# Patient Record
Sex: MALE | Race: WHITE | NOT HISPANIC OR LATINO | Employment: OTHER | URBAN - METROPOLITAN AREA
[De-identification: names, ages, dates, MRNs, and addresses within clinical notes are randomized per-mention and may not be internally consistent; named-entity substitution may affect disease eponyms.]

---

## 2018-02-12 ENCOUNTER — TRANSFERRED RECORDS (OUTPATIENT)
Dept: HEALTH INFORMATION MANAGEMENT | Facility: CLINIC | Age: 63
End: 2018-02-12

## 2018-04-23 ENCOUNTER — TRANSFERRED RECORDS (OUTPATIENT)
Dept: HEALTH INFORMATION MANAGEMENT | Facility: CLINIC | Age: 63
End: 2018-04-23

## 2018-06-04 PROCEDURE — 00000346 ZZHCL STATISTIC REVIEW OUTSIDE SLIDES TC 88321: Performed by: UROLOGY

## 2018-06-13 LAB — COPATH REPORT: NORMAL

## 2018-06-14 ENCOUNTER — PRE VISIT (OUTPATIENT)
Dept: UROLOGY | Facility: CLINIC | Age: 63
End: 2018-06-14

## 2018-06-14 NOTE — TELEPHONE ENCOUNTER
New patient consult for prostate cancer.  Records/imaging/path available in EPIC/Care Everywhere/PACS.

## 2018-06-18 ENCOUNTER — OFFICE VISIT (OUTPATIENT)
Dept: UROLOGY | Facility: CLINIC | Age: 63
End: 2018-06-18
Payer: COMMERCIAL

## 2018-06-18 VITALS
DIASTOLIC BLOOD PRESSURE: 81 MMHG | WEIGHT: 192.2 LBS | HEART RATE: 56 BPM | SYSTOLIC BLOOD PRESSURE: 136 MMHG | HEIGHT: 72 IN | BODY MASS INDEX: 26.03 KG/M2 | OXYGEN SATURATION: 98 %

## 2018-06-18 DIAGNOSIS — C61 MALIGNANT NEOPLASM OF PROSTATE (H): Primary | ICD-10-CM

## 2018-06-18 RX ORDER — ASPIRIN 325 MG
162.5 TABLET ORAL
COMMUNITY

## 2018-06-18 ASSESSMENT — PAIN SCALES - GENERAL: PAINLEVEL: NO PAIN (0)

## 2018-06-18 NOTE — LETTER
6/18/2018       RE: Fili Padilla  [address]  [place] [address]     Dear Colleague,    Thank you for referring your patient, Fili Padilla, to the [facility] UROLOGY AND INST FOR PROSTATE AND UROLOGIC CANCERS at [facility]. Please see a copy of my visit note below.    Service Date: 06/18/2018      REASON FOR VISIT TODAY:  Prostate cancer.      HISTORY OF PRESENT ILLNESS:  Mr. Padilla is a 62-year-old gentleman who comes to see us for recent diagnosis of prostate cancer.  The patient was noted to have a PSA elevations to 5.3 on 09/25/2017 as well as values of 5.01 on 01/19/2018.        The patient underwent a prostate biopsy on 02/14/2018 that showed a [place] 3 + 4 = 7 and small volume of cancer on the right side of the prostate.  The patient apparently had an MRI performed prior to the biopsy that did not show any highly suspicious lesions.  The patient subsequently had a followup PSA on 06/14/2018 that is down to 4.8.  The patient was counseled on various treatment options and comes in today to discuss these further.  The patient notes minimal in the way of urinary symptoms outside of some mild incomplete emptying and some occasional urgency.      PAST MEDICAL HISTORY:  Significant for good health.      PAST SURGICAL HISTORY:  Includes removal of a fatty tumor from the skin.      MEDICATIONS:     1.  Aspirin.      ALLERGIES:  Codeine.      FAMILY HISTORY:  Significant for mother with bladder cancer.      SOCIAL HISTORY:  Negative for tobacco and alcohol.      REVIEW OF SYSTEMS:  Negative for fevers, chills, sweats, nausea, vomiting, unexplained weight changes.      PHYSICAL EXAMINATION:   VITAL SIGNS:  The patient's blood pressure is 136/81, pulse is 56.   GENERAL:  He is in no acute distress.      The patient's pathology slides were reread by our pathologist and our reread includes Carina 3 + 3 = 6 in less than 5% of a core from the right base,  Carina 3 + 3 = 6 in 10% of 1 out of 3 cores in the right mid, Carina 3 + 3 = 6 involving 1 core less than 5% of tissue from the right apex and possibly minute amount of cancer that is likely [place] pattern 3 from the left apex.  No Carina pattern 4 was noted in the specimen.      ASSESSMENT AND PLAN:  Over half of today's for a 50-minute visit was spent counseling the patient regarding his prostate cancer.  I suggested to Mr. Padilla that we are pleased to see that our pathologists have downgraded to his [place] score from 3 + 4 to a 3 + 3.  This now placed the patient in the low risk category as opposed to intermediate risk.        Further, the total volume of cancer is fairly small, even where the cancer was seen.  We discussed with the patient, therefore, he has options including observation in the form of active surveillance as well as surgery, both open and robotically assisted laparoscopic approaches as well as radiation therapy in the form of external beam or proton beam therapy.  We also briefly touched on cryotherapy and high intensity focused ultrasound.  We discussed risks of surgery including incontinence and erectile dysfunction and risks of radiation therapy including development of irritative urinary symptoms, blood in the urine, urgency, frequency of stooling, chronically loose stools or blood in stool.        We then spent a lot of time discussing observation and that active surveillance would entail PSAs and digital rectal exams every 6 months with periodic MRIs of the prostate and periodic repeat biopsies.  I counseled the patient that not only is he ultimately, at this point considered to have low-risk disease, but very close to having very low risk disease.  He falls short of this criteria since he has 3 cores positive instead of 2 cores positive for cancer.        The patient and his wife asked many good questions today and these were answered to their satisfaction.  At this point in  time, the patient wishes to follow an observation approach given the low risk disease characteristics seen on his repeat biopsy.  We will plan on obtaining the patient's MRI that he had done at the outside and have it reread by our radiologist to make sure that there are no highly suspicious lesions that need to be followed up on with a repeat biopsy.  We will then have a phone appointment to go over those results.  Assuming that there are no suspicious lesions, then we will continue on with an observation approach for the time being.        We did discuss that the disease could progress to an incurable state during observation and the patient understands this, but wishes to move forward.  We look forward to talking to Mr. Pace after his MRI has been reread.      D: 2018   T: 2018   MT: DOLORES      Name:     MARCI PACE   MRN:      [id number]        Account:      [id number]   :      1955           Service Date: 2018      Document: [id number]       Again, thank you for allowing me to participate in the care of your patient.      Sincerely,    Mehrdad Jeffers MD

## 2018-06-18 NOTE — MR AVS SNAPSHOT
After Visit Summary   6/18/2018    Fili Padilla    MRN: [id number]           Patient Information     Date Of Birth          1955        Visit Information        Provider Department      6/18/2018 2:30 PM Mehrdad Jeffers MD [facility] Urology and [facility] for Prostate and Urologic Cancers        Today's Diagnoses     Malignant neoplasm of prostate (H)    -  1      Care Instructions    Schedule a telephone appointment with Dr. Jeffers.    It was a pleasure meeting with you today.  Thank you for allowing me and my team the privilege of caring for you today.  YOU are the reason we are here, and I truly hope we provided you with the excellent service you deserve.  Please let us know if there is anything else we can do for you so that we can be sure you are leaving completely satisfied with your care experience.                  Follow-ups after your visit        Your next 10 appointments already scheduled     Sep 24, 2018  3:30 PM CDT   Telephone Call with Mehrdad Jeffers MD   [facility] Urology and [facility] for Prostate and Urologic Cancers ([facility])    [address] [place] [address] [phone]           Note: this is not an onsite visit; there is no need to come to the facility.  Thank you for choosing [facility] for your health care needs. Below is some information for patients who are interested in having their follow-up visit with a physician by telephone. In some cases, a telephone visit can be an effective and convenient way to manage your follow-up care. Choosing a telephone visit rather than a face to face visit for your follow-up care is a decision that you and your physician can make together to ensure it meets all of your needs.  A face to face visit is always an available option, if you choose to do so.  We want to make sure you have all of the information you need about the telephone  visit option and answer all of your questions before you decide to schedule a telephone follow-up visit. If you have any questions, you may talk to a staff member or our financial counselor at [phone].  1. General overview Our clinic sees patients for a variety of conditions and concerns. A face to face visit with your doctor is required for any new concerns or for your initial visit. If you and your doctor decide that a follow up visit by telephone is appropriate, you may decide to opt for a telephone visit.   2. Billing and insurance coverage There is a charge for telephone visits, similar to the charge for an in-person visit. Your bill is based on the amount of time you and your physician are on the phone. We will bill each visit to your insurance company (just like your other medical visits), and you will be responsible for any costs not paid by your insurance company. The charge may be denied by your insurance company, in which case you will be responsible for the entire amount billed. The decision to cover the cost is determined by your insurance company. If you want to know what your insurance company will cover, we encourage you to contact them to determine your coverage. The codes below are the codes we use when billing for telephone visits and the associated charges. This may help you work with your insurance company to determine your benefits.   Billing CPT codes for Telephone visits  20739 ($30), 87828 ($35), 84648 ($40)              Who to contact     Please call your clinic at [phone] to:    Ask questions about your health    Make or cancel appointments    Discuss your medicines    Learn about your test results    Speak to your doctor            Additional Information About Your Visit        PharmaINhart Information     [email or web address] gives you secure access to your electronic health record. If you see a primary care provider, you can also send messages to your care team and make appointments. If you  have questions, please call your primary care clinic.  If you do not have a primary care provider, please call [phone] and they will assist you.      Superbly is an electronic gateway that provides easy, online access to your medical records. With Superbly, you can request a clinic appointment, read your test results, renew a prescription or communicate with your care team.     To access your existing account, please contact your [facility] or call [phone] for assistance.        Care EveryWhere ID     This is your Care EveryWhere ID. This could be used by other organizations to access your [place] medical records  [id number]        Your Vitals Were     Pulse Height Pulse Oximetry BMI (Body Mass Index)          56 1.829 m (6') 98% 26.07 kg/m2         Blood Pressure from Last 3 Encounters:   06/18/18 136/81    Weight from Last 3 Encounters:   06/18/18 87.2 kg (192 lb 3.2 oz)              Today, you had the following     No orders found for display       Primary Care Provider    None Specified       No primary provider on file.        Equal Access to Services     SANDRA [facility] Hospital[facility] : Hadii kun ku hadasho Soomaali, waaxda luqadaha, qaybta kaalmada adeegyaelie, amando lerma . So [facility] [phone].    ATENCIÓN: Si habla español, tiene a renee disposición servicios gratuitos de asistencia lingüística. Llame al [phone].    We comply with applicable federal civil rights laws and [place] laws. We do not discriminate on the basis of race, color, national origin, age, disability, sex, sexual orientation, or gender identity.            Thank you!     Thank you for choosing [facility] UROLOGY AND [facility] FOR PROSTATE AND UROLOGIC CANCERS  for your care. Our goal is always to provide you with excellent care. Hearing back from our patients is one way we can continue to improve our services. Please take a few minutes to complete the written survey that you may receive in  the mail after your visit with us. Thank you!             Your Updated Medication List - Protect others around you: Learn how to safely use, store and throw away your medicines at www.disposemymeds.org.          This list is accurate as of 6/18/18 11:59 PM.  Always use your most recent med list.                   Brand Name Dispense Instructions for use Diagnosis    aspirin 325 MG tablet      Take 325 mg by mouth daily

## 2018-06-18 NOTE — PATIENT INSTRUCTIONS
Schedule a telephone appointment with Dr. Jeffers.    It was a pleasure meeting with you today.  Thank you for allowing me and my team the privilege of caring for you today.  YOU are the reason we are here, and I truly hope we provided you with the excellent service you deserve.  Please let us know if there is anything else we can do for you so that we can be sure you are leaving completely satisfied with your care experience.

## 2018-06-19 NOTE — PROGRESS NOTES
Service Date: 06/18/2018      REASON FOR VISIT TODAY:  Prostate cancer.      HISTORY OF PRESENT ILLNESS:  Mr. Padilla is a 62-year-old gentleman who comes to see us for recent diagnosis of prostate cancer.  The patient was noted to have a PSA elevations to 5.3 on 09/25/2017 as well as values of 5.01 on 01/19/2018.        The patient underwent a prostate biopsy on 02/14/2018 that showed a [place] 3 + 4 = 7 and small volume of cancer on the right side of the prostate.  The patient apparently had an MRI performed prior to the biopsy that did not show any highly suspicious lesions.  The patient subsequently had a followup PSA on 06/14/2018 that is down to 4.8.  The patient was counseled on various treatment options and comes in today to discuss these further.  The patient notes minimal in the way of urinary symptoms outside of some mild incomplete emptying and some occasional urgency.      PAST MEDICAL HISTORY:  Significant for good health.      PAST SURGICAL HISTORY:  Includes removal of a fatty tumor from the skin.      MEDICATIONS:     1.  Aspirin.      ALLERGIES:  Codeine.      FAMILY HISTORY:  Significant for mother with bladder cancer.      SOCIAL HISTORY:  Negative for tobacco and alcohol.      REVIEW OF SYSTEMS:  Negative for fevers, chills, sweats, nausea, vomiting, unexplained weight changes.      PHYSICAL EXAMINATION:   VITAL SIGNS:  The patient's blood pressure is 136/81, pulse is 56.   GENERAL:  He is in no acute distress.      The patient's pathology slides were reread by our pathologist and our reread includes [place] 3 + 3 = 6 in less than 5% of a core from the right base, [place] 3 + 3 = 6 in 10% of 1 out of 3 cores in the right mid, [place] 3 + 3 = 6 involving 1 core less than 5% of tissue from the right apex and possibly minute amount of cancer that is likely Carina pattern 3 from the left apex.  No Carina pattern 4 was noted in the specimen.      ASSESSMENT AND PLAN:  Over half of today's  for a 50-minute visit was spent counseling the patient regarding his prostate cancer.  I suggested to Mr. Padilla that we are pleased to see that our pathologists have downgraded to his [place] score from 3 + 4 to a 3 + 3.  This now placed the patient in the low risk category as opposed to intermediate risk.        Further, the total volume of cancer is fairly small, even where the cancer was seen.  We discussed with the patient, therefore, he has options including observation in the form of active surveillance as well as surgery, both open and robotically assisted laparoscopic approaches as well as radiation therapy in the form of external beam or proton beam therapy.  We also briefly touched on cryotherapy and high intensity focused ultrasound.  We discussed risks of surgery including incontinence and erectile dysfunction and risks of radiation therapy including development of irritative urinary symptoms, blood in the urine, urgency, frequency of stooling, chronically loose stools or blood in stool.        We then spent a lot of time discussing observation and that active surveillance would entail PSAs and digital rectal exams every 6 months with periodic MRIs of the prostate and periodic repeat biopsies.  I counseled the patient that not only is he ultimately, at this point considered to have low-risk disease, but very close to having very low risk disease.  He falls short of this criteria since he has 3 cores positive instead of 2 cores positive for cancer.        The patient and his wife asked many good questions today and these were answered to their satisfaction.  At this point in time, the patient wishes to follow an observation approach given the low risk disease characteristics seen on his repeat biopsy.  We will plan on obtaining the patient's MRI that he had done at the outside and have it reread by our radiologist to make sure that there are no highly suspicious lesions that need to be followed  up on with a repeat biopsy.  We will then have a phone appointment to go over those results.  Assuming that there are no suspicious lesions, then we will continue on with an observation approach for the time being.        We did discuss that the disease could progress to an incurable state during observation and the patient understands this, but wishes to move forward.  We look forward to talking to Mr. Pace after his MRI has been reread.         EMILY TERRY MD             D: 2018   T: 2018   MT: DOLORES      Name:     MARCI PACE   MRN:      [id number]        Account:      [id number]   :      1955           Service Date: 2018      Document: [id number]

## 2018-06-20 ENCOUNTER — TELEPHONE (OUTPATIENT)
Dept: UROLOGY | Facility: CLINIC | Age: 63
End: 2018-06-20

## 2018-06-20 NOTE — TELEPHONE ENCOUNTER
Patient giving the address to have [facility] radiology department mail his MRI films and reports to [address] [place] [address] per Megan to have our radiologist review the report and imaging.    Krysta Moore MA

## 2018-06-20 NOTE — TELEPHONE ENCOUNTER
[facility] Call Center    Phone Message    May a detailed message be left on voicemail: yes    Reason for Call: Other: Patient would like a call back to have a further discussion about the current possible stage of their prostate cancer.     Action Taken: Message routed to:  [facility] ([facility] Urology

## 2018-06-22 ENCOUNTER — MYC MEDICAL ADVICE (OUTPATIENT)
Dept: UROLOGY | Facility: CLINIC | Age: 63
End: 2018-06-22

## 2018-09-10 ENCOUNTER — OFFICE VISIT (OUTPATIENT)
Dept: OPHTHALMOLOGY | Facility: CLINIC | Age: 63
End: 2018-09-10
Attending: OPHTHALMOLOGY
Payer: COMMERCIAL

## 2018-09-10 DIAGNOSIS — H35.371 EPIRETINAL MEMBRANE (ERM) OF RIGHT EYE: Primary | ICD-10-CM

## 2018-09-10 DIAGNOSIS — H25.13 NUCLEAR SENILE CATARACT OF BOTH EYES: ICD-10-CM

## 2018-09-10 PROCEDURE — 92134 CPTRZ OPH DX IMG PST SGM RTA: CPT | Mod: ZF | Performed by: OPHTHALMOLOGY

## 2018-09-10 PROCEDURE — G0463 HOSPITAL OUTPT CLINIC VISIT: HCPCS | Mod: ZF

## 2018-09-10 ASSESSMENT — CUP TO DISC RATIO
OD_RATIO: 0.4
OS_RATIO: 0.4

## 2018-09-10 ASSESSMENT — SLIT LAMP EXAM - LIDS
COMMENTS: NORMAL
COMMENTS: NORMAL

## 2018-09-10 ASSESSMENT — EXTERNAL EXAM - LEFT EYE: OS_EXAM: NORMAL

## 2018-09-10 ASSESSMENT — VISUAL ACUITY
OD_CC+: -1
OS_CC: 20/15
OD_CC: 20/40
METHOD: SNELLEN - LINEAR

## 2018-09-10 ASSESSMENT — TONOMETRY
OD_IOP_MMHG: 17
IOP_METHOD: TONOPEN
OS_IOP_MMHG: 18

## 2018-09-10 ASSESSMENT — CONF VISUAL FIELD
METHOD: COUNTING FINGERS
OS_SUPERIOR_TEMPORAL_RESTRICTION: 3
OS_INFERIOR_TEMPORAL_RESTRICTION: 3
OD_INFERIOR_TEMPORAL_RESTRICTION: 3

## 2018-09-10 ASSESSMENT — REFRACTION_WEARINGRX
OD_SPHERE: -3.00
OS_ADD: +2.50
OS_SPHERE: -3.00
OD_AXIS: 014
OD_CYLINDER: +1.50
OS_CYLINDER: +0.50
OS_AXIS: 007
OD_ADD: +2.50
SPECS_TYPE: PAL

## 2018-09-10 ASSESSMENT — EXTERNAL EXAM - RIGHT EYE: OD_EXAM: NORMAL

## 2018-09-10 NOTE — NURSING NOTE
Chief Complaints and History of Present Illnesses   Patient presents with     Retinal Evaluation     HPI    Affected eye(s):  Right   Symptoms:     Blurred vision   Redness   Dryness   Itching         Do you have eye pain now?:  No      Comments:  Vision more blurry in RE for the past 1-2 months. No pain or discomfort in RE.  Itching and dryness BE x6 months, relief with AT's. No changes in VA in left eye.    Sandro SEGUNDO September 10, 2018 3:26 PM  Noel DEJESUS September 10, 2018 3:26 PM

## 2018-09-10 NOTE — MR AVS SNAPSHOT
After Visit Summary   9/10/2018    Fili Padilla    MRN: [id number]           Patient Information     Date Of Birth          1955        Visit Information        Provider Department      9/10/2018 2:45 PM Puneet Murillo MD [facility]        Today's Diagnoses     Epiretinal membrane (ERM) of right eye    -  1    Nuclear senile cataract of both eyes           Follow-ups after your visit        Follow-up notes from your care team     Return in about 6 months (around 3/10/2019) for 6 months DFE, OCT.      Your next 10 appointments already scheduled     Sep 24, 2018  3:30 PM CDT   Telephone Call with Mehrdad Jeffers MD   [facility] Urology and [facility] for Prostate and Urologic Cancers ([facility])    [address] [place] [id number] [phone]           Note: this is not an onsite visit; there is no need to come to the facility.  Thank you for choosing [facility] for your health care needs. Below is some information for patients who are interested in having their follow-up visit with a physician by telephone. In some cases, a telephone visit can be an effective and convenient way to manage your follow-up care. Choosing a telephone visit rather than a face to face visit for your follow-up care is a decision that you and your physician can make together to ensure it meets all of your needs.  A face to face visit is always an available option, if you choose to do so.  We want to make sure you have all of the information you need about the telephone visit option and answer all of your questions before you decide to schedule a telephone follow-up visit. If you have any questions, you may talk to a staff member or our financial counselor at [phone].  1. General overview Our clinic sees patients for a variety of conditions and concerns. A face to face visit with your doctor is required for any new  concerns or for your initial visit. If you and your doctor decide that a follow up visit by telephone is appropriate, you may decide to opt for a telephone visit.   2. Billing and insurance coverage There is a charge for telephone visits, similar to the charge for an in-person visit. Your bill is based on the amount of time you and your physician are on the phone. We will bill each visit to your insurance company (just like your other medical visits), and you will be responsible for any costs not paid by your insurance company. The charge may be denied by your insurance company, in which case you will be responsible for the entire amount billed. The decision to cover the cost is determined by your insurance company. If you want to know what your insurance company will cover, we encourage you to contact them to determine your coverage. The codes below are the codes we use when billing for telephone visits and the associated charges. This may help you work with your insurance company to determine your benefits.   Billing CPT codes for Telephone visits  87582 ($30), 21357 ($35), 85730 ($40)            Mar 13, 2019  9:00 AM CDT   NEW RETINA with Puneet Murillo MD   Eye Clinic ([facility])    [address] Clin [address] [place] [address] [phone]              Who to contact     Please call your clinic at [phone] to:    Ask questions about your health    Make or cancel appointments    Discuss your medicines    Learn about your test results    Speak to your doctor            Additional Information About Your Visit        HIGH MOBILITYhart Information     [employer] gives you secure access to your electronic health record. If you see a primary care provider, you can also send messages to your care team and make appointments. If you have questions, please call your primary care clinic.  If you do not have a primary care provider, please call [phone] and  they will assist you.      [employer] is an electronic gateway that provides easy, online access to your medical records. With [employer], you can request a clinic appointment, read your test results, renew a prescription or communicate with your care team.     To access your existing account, please contact your [facility] or call [phone] for assistance.        Care EveryWhere ID     This is your Care EveryWhere ID. This could be used by other organizations to access your [place] medical records  [id number]         Blood Pressure from Last 3 Encounters:   06/18/18 136/81    Weight from Last 3 Encounters:   06/18/18 87.2 kg (192 lb 3.2 oz)              We Performed the Following     OCT Retina Spectralis OU (both eyes)        Primary Care Provider Fax #    Physician No Ref-Primary [phone]       No address on file        Equal Access to Services     SANDRA QUINTANA : Hadal Toth, wamitchel luqadaha, qaybta kaalmada melodie, amando lerma . So [facility] [phone].    ATENCIÓN: Si habla español, tiene a renee disposición servicios gratuitos de asistencia lingüística. Llame al [phone].    We comply with applicable federal civil rights laws and [place] laws. We do not discriminate on the basis of race, color, national origin, age, disability, sex, sexual orientation, or gender identity.            Thank you!     Thank you for choosing EYE CLINIC  for your care. Our goal is always to provide you with excellent care. Hearing back from our patients is one way we can continue to improve our services. Please take a few minutes to complete the written survey that you may receive in the mail after your visit with us. Thank you!             Your Updated Medication List - Protect others around you: Learn how to safely use, store and throw away your medicines at www.disposemymeds.org.          This list is accurate as of 9/10/18  4:39 PM.  Always use  your most recent med list.                   Brand Name Dispense Instructions for use Diagnosis    aspirin 325 MG tablet      Take 325 mg by mouth daily

## 2018-09-11 ENCOUNTER — HOSPITAL ENCOUNTER (INPATIENT)
Dept: GENERAL RADIOLOGY | Facility: CLINIC | Age: 63
End: 2018-09-11
Attending: UROLOGY

## 2018-09-17 ENCOUNTER — PRE VISIT (OUTPATIENT)
Dept: UROLOGY | Facility: CLINIC | Age: 63
End: 2018-09-17

## 2018-09-17 ENCOUNTER — TRANSFERRED RECORDS (OUTPATIENT)
Dept: HEALTH INFORMATION MANAGEMENT | Facility: CLINIC | Age: 63
End: 2018-09-17

## 2018-09-24 ENCOUNTER — TELEPHONE (OUTPATIENT)
Dept: UROLOGY | Facility: CLINIC | Age: 63
End: 2018-09-24

## 2018-09-24 ENCOUNTER — VIRTUAL VISIT (OUTPATIENT)
Dept: UROLOGY | Facility: CLINIC | Age: 63
End: 2018-09-24
Payer: COMMERCIAL

## 2018-09-24 DIAGNOSIS — C61 MALIGNANT NEOPLASM OF PROSTATE (H): Primary | ICD-10-CM

## 2018-09-24 NOTE — TELEPHONE ENCOUNTER
[facility] Call [facility]    Phone Message    May a detailed message be left on voicemail: not necessary    Reason for Call: Other: 1. Confirming phone call appointment at 3:30 today.  Yes per EMR.  2. Please call work number [phone]  3.PSA lab drawn Monday last week results up to 6.3.    Action Taken: Message routed to:  [facility] ([facility] Urology

## 2018-09-24 NOTE — PROGRESS NOTES
REASON FOR VISIT TODAY:  Prostate cancer.       HISTORY OF PRESENT ILLNESS:  Mr. Padilla is a 62-year-old gentleman who comes to see us for recent diagnosis of prostate cancer.  The patient was noted to have a PSA elevations to 5.3 on 09/25/2017 as well as values of 5.01 on 01/19/2018.         The patient underwent a prostate biopsy on 02/14/2018 that showed a Carina 3 + 4 = 7 and small volume of cancer on the right side of the prostate.  The patient apparently had an MRI performed prior to the biopsy that did not show any highly suspicious lesions.  The patient subsequently had a followup PSA on 06/14/2018 that is down to 4.8.  The patient was counseled on various treatment options and comes in today to discuss these further.  The patient notes minimal in the way of urinary symptoms outside of some mild incomplete emptying and some occasional urgency.  The patient      OBJECTIVE: PSA 6.3 ng/mL from 9/17/18.  MRI from 10/2017 Re-Read shows PI-RADS 4 lesion in left mid PZ.    ASSESSMENT AND PLAN:  Over half of today's 23-minute visit was spent counseling the patient regarding his prostate cancer.  I counseled the patient that his PSA is essentially stable.  His MRI does show a PI-RADS 4 lesion,but inflammation remains in the differential.  We will recheck a PSA in Jan or Feb 2019, will obtain an MRI after that and arrange for the patient to get a surveillance biopsy following that.  We will then re-assess the patient's appropriateness for ongoing surveillance.  The patient is an agreement with the plan.

## 2018-09-24 NOTE — TELEPHONE ENCOUNTER
Patient called and will have latest PSA sent to us he stated 6.3. Carolina Frias, GIANFRANCON Staff Nurse     no

## 2018-09-24 NOTE — MR AVS SNAPSHOT
After Visit Summary   9/24/2018    Fili Padilla    MRN: [id number]           Patient Information     Date Of Birth          1955        Visit Information        Provider Department      9/24/2018 3:30 PM Mehrdad Jeffers MD [facility] and [facility] for Prostate and Urologic Cancers        Today's Diagnoses     Malignant neoplasm of prostate (H)    -  1       Follow-ups after your visit        Your next 10 appointments already scheduled     Mar 13, 2019  9:00 AM CDT   NEW RETINA with Fredericus Catherine Murillo MD   Eye Clinic ([facility])    [address]  9[address]  [facility] [id number] [phone]              Future tests that were ordered for you today     Open Future Orders        Priority Expected Expires Ordered    PSA tumor marker Routine 1/24/2019 9/24/2019 9/24/2018            Who to contact     Please call your clinic at [phone] to:    Ask questions about your health    Make or cancel appointments    Discuss your medicines    Learn about your test results    Speak to your doctor            Additional Information About Your Visit        [email or web address] Information     E[employer]rest gives you secure access to your electronic health record. If you see a primary care provider, you can also send messages to your care team and make appointments. If you have questions, please call your primary care clinic.  If you do not have a primary care provider, please call [phone] and they will assist you.      Everest is an electronic gateway that provides easy, online access to your medical records. With [employer]rest, you can request a clinic appointment, read your test results, renew a prescription or communicate with your care team.     To access your existing account, please contact your [facility] or call [phone] for assistance.        Care EveryWhere ID     This is your Care EveryWhere ID. This  could be used by other organizations to access your [place] medical records  [id number]         Blood Pressure from Last 3 Encounters:   06/18/18 136/81    Weight from Last 3 Encounters:   06/18/18 87.2 kg (192 lb 3.2 oz)               Primary Care Provider Fax #    Physician No Ref-Primary [phone]       No address on file        Equal Access to Services     [facility]: Hadii aad ku hadasho Soomaali, waaxda luqadaha, qaybta kaalmada adeegyada, amando bass jeffreyn ademarcio limonmeagan shawryanannemarie . So [facility] [phone].    ATENCIÓN: Si habla español, tiene a renee disposición servicios gratuitos de asistencia lingüística. Alie al [phone].    We comply with applicable federal civil rights laws and [place] laws. We do not discriminate on the basis of race, color, national origin, age, disability, sex, sexual orientation, or gender identity.            Thank you!     Thank you for choosing [facility] UROLOGY AND [facility] FOR PROSTATE AND UROLOGIC CANCERS  for your care. Our goal is always to provide you with excellent care. Hearing back from our patients is one way we can continue to improve our services. Please take a few minutes to complete the written survey that you may receive in the mail after your visit with us. Thank you!             Your Updated Medication List - Protect others around you: Learn how to safely use, store and throw away your medicines at www.disposemymeds.org.          This list is accurate as of 9/24/18  4:13 PM.  Always use your most recent med list.                   Brand Name Dispense Instructions for use Diagnosis    aspirin 325 MG tablet      Take 325 mg by mouth daily

## 2018-12-05 DIAGNOSIS — C61 MALIGNANT NEOPLASM OF PROSTATE (H): Primary | ICD-10-CM

## 2019-01-16 DIAGNOSIS — C61 MALIGNANT NEOPLASM OF PROSTATE (H): Primary | ICD-10-CM

## 2019-01-16 RX ORDER — DIAZEPAM 10 MG
TABLET ORAL
Qty: 1 TABLET | Refills: 0 | Status: SHIPPED | OUTPATIENT
Start: 2019-01-16 | End: 2020-06-08

## 2019-01-22 ENCOUNTER — ANCILLARY PROCEDURE (OUTPATIENT)
Dept: MRI IMAGING | Facility: CLINIC | Age: 64
End: 2019-01-22
Attending: UROLOGY

## 2019-01-22 ENCOUNTER — ANCILLARY PROCEDURE (OUTPATIENT)
Dept: PET IMAGING | Facility: CLINIC | Age: 64
End: 2019-01-22

## 2019-01-22 DIAGNOSIS — C61 MALIGNANT NEOPLASM OF PROSTATE (H): ICD-10-CM

## 2019-01-22 LAB
CREAT BLD-MCNC: 0.8 MG/DL (ref 0.5–1.2)
GFR SERPL CREATININE-BSD FRML MDRD: NORMAL ML/MIN/{1.73_M2}
GFRB: NORMAL

## 2019-01-22 RX ORDER — GADOBUTROL 604.72 MG/ML
0.1 INJECTION INTRAVENOUS ONCE
Status: COMPLETED | OUTPATIENT
Start: 2019-01-22 | End: 2019-01-22

## 2019-01-22 RX ADMIN — GADOBUTROL 9 ML: 604.72 INJECTION INTRAVENOUS at 10:15

## 2019-01-29 ENCOUNTER — MYC MEDICAL ADVICE (OUTPATIENT)
Dept: UROLOGY | Facility: CLINIC | Age: 64
End: 2019-01-29

## 2019-02-04 ENCOUNTER — TELEPHONE (OUTPATIENT)
Dept: UROLOGY | Facility: CLINIC | Age: 64
End: 2019-02-04

## 2019-02-04 ENCOUNTER — PRE VISIT (OUTPATIENT)
Dept: UROLOGY | Facility: CLINIC | Age: 64
End: 2019-02-04

## 2019-02-04 ENCOUNTER — MYC MEDICAL ADVICE (OUTPATIENT)
Dept: UROLOGY | Facility: CLINIC | Age: 64
End: 2019-02-04

## 2019-02-04 NOTE — TELEPHONE ENCOUNTER
Patient called and answered many many questions regarding results and upcoming mri guided biopsy Carolina Frias, GIANFRANCON Staff Nurse

## 2019-02-05 ENCOUNTER — DOCUMENTATION ONLY (OUTPATIENT)
Dept: CARE COORDINATION | Facility: CLINIC | Age: 64
End: 2019-02-05

## 2019-02-05 ENCOUNTER — PRE VISIT (OUTPATIENT)
Dept: UROLOGY | Facility: CLINIC | Age: 64
End: 2019-02-05

## 2019-02-05 DIAGNOSIS — R97.20 ELEVATED PROSTATE SPECIFIC ANTIGEN (PSA): Primary | ICD-10-CM

## 2019-02-05 RX ORDER — CIPROFLOXACIN 500 MG/1
500 TABLET, FILM COATED ORAL 2 TIMES DAILY
Qty: 6 TABLET | Refills: 0 | Status: SHIPPED | OUTPATIENT
Start: 2019-02-05 | End: 2019-03-13

## 2019-02-05 NOTE — TELEPHONE ENCOUNTER
Patient coming in for prostate biopsy. Patient contacted regarding prep needed to be done prior to biopsy. This includes stopping all blood thinners for 1 week prior to biopsy, obtaining and administering fleets enema 2 hours prior to biopsy. Patient will also receive Cipro 500 mg antibiotic. Patient stated understanding and had no further questions

## 2019-02-11 ENCOUNTER — OFFICE VISIT (OUTPATIENT)
Dept: UROLOGY | Facility: CLINIC | Age: 64
End: 2019-02-11

## 2019-02-11 VITALS
HEIGHT: 72 IN | DIASTOLIC BLOOD PRESSURE: 96 MMHG | BODY MASS INDEX: 25.33 KG/M2 | SYSTOLIC BLOOD PRESSURE: 165 MMHG | WEIGHT: 187 LBS | HEART RATE: 70 BPM

## 2019-02-11 DIAGNOSIS — C61 PROSTATE CANCER (H): ICD-10-CM

## 2019-02-11 DIAGNOSIS — R97.20 ELEVATED PSA: Primary | ICD-10-CM

## 2019-02-11 RX ORDER — GENTAMICIN 40 MG/ML
80 INJECTION, SOLUTION INTRAMUSCULAR; INTRAVENOUS ONCE
Status: COMPLETED | OUTPATIENT
Start: 2019-02-11 | End: 2019-02-11

## 2019-02-11 RX ADMIN — GENTAMICIN 80 MG: 40 INJECTION, SOLUTION INTRAMUSCULAR; INTRAVENOUS at 12:28

## 2019-02-11 ASSESSMENT — PAIN SCALES - GENERAL
PAINLEVEL: NO PAIN (0)
PAINLEVEL: NO PAIN (0)

## 2019-02-11 ASSESSMENT — MIFFLIN-ST. JEOR: SCORE: 1676.23

## 2019-02-11 NOTE — NURSING NOTE
Chief Complaint   Patient presents with     RECHECK     Biopsy        Makenna Romeo MA    Invasive Procedure Safety Checklist:    Procedure:     Action: Complete sections and checkboxes as appropriate.    Pre-procedure:  1. Patient ID Verified with 2 identifiers (Ada and  or MRN) : YES    2. Procedure and site verified with patient/designee (when able) : YES    3. Accurate consent documentation in medical record : YES    4. H&P (or appropriate assessment) documented in medical record : NO  H&P must be up to 30 days prior to procedure an updated within 24 hours of                 Procedure as applicable.     5. Relevant diagnostic and radiology test results appropriately labeled and displayed as applicable : NO    6. Blood products, implants, devices, and/or special equipment available for the procedure as applicable : NO    7. Procedure site(s) marked with provider initials [Exclusions: none] : NO    8. Marking not required. Reason : Yes  Procedure does not require site marking    Time Out:     Time-Out performed immediately prior to starting procedure, including verbal and active participation of all team members addressing: YES    1. Correct patient identity.  2. Confirmed that the correct side and site are marked.  3. An accurate procedure to be done.  4. Agreement on the procedure to be done.  5. Correct patient position.  6. Relevant images and results are properly labeled and appropriately displayed.  7. The need to administer antibiotics or fluids for irrigation purposes during the procedure as applicable.  8. Safety precautions based on patient history or medication use.    During Procedure: Verification of correct person, site, and procedure occurs any time the responsibility for care of the patient is transferred to another member of the care team.

## 2019-02-11 NOTE — LETTER
RE: Fili Padilla  [id number]  [facility] [address]     Dear Colleague,    Thank you for referring your patient, Fili Padilla, to the [facility] UROLOGY AND INST FOR PROSTATE AND UROLOGIC CANCERS at [facility]. Please see a copy of my visit note below.    Service Date: 02/11/2019      REASON FOR VISIT TODAY:  Prostate biopsy.      HISTORY OF PRESENT ILLNESS:  Mr. Padilla is a 63-year-old gentleman followed in our clinic for history of prostate cancer.  The patient is on active surveillance for prostate cancer and presents today for a surveillance biopsy.      PROCEDURE:  After informed consent was obtained and after confirming the patient has been off aspirin or aspirin-like products for a week, did his enema and took his antibiotics, he was placed left side down on the biopsy table.  Digital rectal exam revealed a normal-feeling prostate.  Next, the ultrasound probe was lubricated and placed into the patient's rectum.  There were some calcifications noted toward the apex of the prostate, but no obvious hypoechoic lesions noted.  Next, 5 mL of lidocaine were injected at the junction of the prostate and the seminal vesicles bilaterally.  We then obtained measurements of the prostate with a volume estimated at 50 mL. We then performed an ultrasound sweep of the prostate.  These post-processed images were then utilized by the UroNav software to create a 3D prostate volume, which was then overlaid onto the patient's MRI images from 01/22/2019 and MRI-ultrasound fusion was performed.  This allowed us to identify target #1 lesion in the left base peripheral zone at 5 o'clock, target #2 lesion in the right mid peripheral zone at 8 o'clock and a lesion in the right base peripheral zone at 8 o'clock as well.  Two cores were taken from each of these locations as well as 12 cores taken in a standard sextant distribution for a total of 18 cores obtained.  The patient  tolerated the procedure without difficulty.  He was counseled that he can expect to see blood in his urine, semen and stool for the next several days and contact us should he develop any fever, chills or sweats.  Otherwise, we will plan on seeing the patient back on the  to go over the results of his biopsy.      EMILY TERRY MD      D: 2019   T: 2019   MT: KIMBERLY   Name:     MARCI PACE   MRN:      [id number]        Account:      [id number]   :      1955           Service Date: 2019   Document: [id number]

## 2019-02-11 NOTE — PATIENT INSTRUCTIONS
[place] for Prostate and Urologic Cancers  Precautions Following a Prostate Biopsy    There are four conditions that you should watch for after a prostate biopsy:    1. Excessive pain  2. Bleeding irregularities (passing numerous  dime sized  clots or if your urine looks like cranberry juice)  3. Fever of 100 degrees or more  4. If you are unable to urinate        If any of these occur, call the Urology Clinic during normal business hours (M-F, 8:00-4:30) at [phone].  If you experience a problem after normal business hours, call our 24-hour phone number at [phone] and ask for the Urology Resident on call to be paged.      If you experience any discomfort following the biopsy, you may take Tylenol.  DO NOT TAKE ASPIRIN unless specified by your physician.   If the discomfort becomes severe or uncontrolled by medication, contact the Urology Clinic or Urology Resident (after normal business hours).      Do not be alarmed if you have some blood in your stool, in your urine, or ejaculate (semen).  This occurrence is normal and may last up to three (3) or four (4) days, usually intermittently.  Blood in the ejaculate (semen) may last several weeks, up to about a dozen ejaculations.  The blood in your ejaculate may appear as brown streaks, blood tinged, and immediately following a biopsy, it may appear bright red.      If you run a fever above 100 degrees, call the Urology Clinic or Urology Resident (after normal business hours) immediately.  If you are unable to reach your physician or the Resident on call, go to the nearest emergency room.  Explain that you have had a transrectal biopsy of your prostate and what problems you are experiencing.        You should attempt to urinate following your biopsy before you leave the clinic.  If you are unable to urinate four (4) to six (6) hours after you leave the clinic, you will need to contact the Urology Clinic or the Resident on call.  If you are unable to reach  your physician or the Resident on call, go to the nearest emergency room.            If you have any questions or concerns after your biopsy, feel free to contact the Urology Clinic at [phone] during M-F, 8:00-5pm business hours.  If you need to speak with someone after normal business hours, call [phone] and ask for the Resident on call to be paged.

## 2019-02-11 NOTE — PROGRESS NOTES
Service Date: 02/11/2019      REASON FOR VISIT TODAY:  Prostate biopsy.      HISTORY OF PRESENT ILLNESS:  Mr. Padilla is a 63-year-old gentleman followed in our clinic for history of prostate cancer.  The patient is on active surveillance for prostate cancer and presents today for a surveillance biopsy.      PROCEDURE:  After informed consent was obtained and after confirming the patient has been off aspirin or aspirin-like products for a week, did his enema and took his antibiotics, he was placed left side down on the biopsy table.  Digital rectal exam revealed a normal-feeling prostate.  Next, the ultrasound probe was lubricated and placed into the patient's rectum.  There were some calcifications noted toward the apex of the prostate, but no obvious hypoechoic lesions noted.  Next, 5 mL of lidocaine were injected at the junction of the prostate and the seminal vesicles bilaterally.  We then obtained measurements of the prostate with a volume estimated at 50 mL. We then performed an ultrasound sweep of the prostate.  These post-processed images were then utilized by the UroNav software to create a 3D prostate volume, which was then overlaid onto the patient's MRI images from 01/22/2019 and MRI-ultrasound fusion was performed.  This allowed us to identify target #1 lesion in the left base peripheral zone at 5 o'clock, target #2 lesion in the right mid peripheral zone at 8 o'clock and a lesion in the right base peripheral zone at 8 o'clock as well.  Two cores were taken from each of these locations as well as 12 cores taken in a standard sextant distribution for a total of 18 cores obtained.  The patient tolerated the procedure without difficulty.  He was counseled that he can expect to see blood in his urine, semen and stool for the next several days and contact us should he develop any fever, chills or sweats.  Otherwise, we will plan on seeing the patient back on the 18th to go over the results of his  biopsy.         EMILY TERRY MD             D: 2019   T: 2019   MT: KIMBERLY      Name:     MARCI PACE   MRN:      [id number]        Account:      [id number]   :      1955           Service Date: 2019      Document: [id number]

## 2019-02-11 NOTE — NURSING NOTE
The following medication was given:     MEDICATION:  Lidocaine  ROUTE: Md dose  SITE: MD dose  DOSE: 30ml  LOT #: [id number]  : [employer]  EXPIRATION DATE: 06/22  NDC#: 4868040959   Was there drug waste? Yes  Amount of drug waste (mL): 20ml.  Reason for waste:  As per MD Makenna Romeo CMA  February 11, 2019      The following medication was given:     MEDICATION:  Gentamicin   ROUTE: IM  SITE: RUQ - Gluteus  DOSE: 80ml  LOT #: [id number]  : [employer]  EXPIRATION DATE: 04/20  NDC#: 1939934294   Was there drug waste? No      Makenna Romeo CMA  February 11, 2019

## 2019-02-12 LAB — COPATH REPORT: NORMAL

## 2019-02-18 ENCOUNTER — OFFICE VISIT (OUTPATIENT)
Dept: UROLOGY | Facility: CLINIC | Age: 64
End: 2019-02-18

## 2019-02-18 VITALS
HEART RATE: 62 BPM | BODY MASS INDEX: 25.6 KG/M2 | WEIGHT: 189 LBS | DIASTOLIC BLOOD PRESSURE: 86 MMHG | SYSTOLIC BLOOD PRESSURE: 140 MMHG | HEIGHT: 72 IN

## 2019-02-18 DIAGNOSIS — C61 PROSTATE CANCER (H): Primary | ICD-10-CM

## 2019-02-18 ASSESSMENT — MIFFLIN-ST. JEOR: SCORE: 1685.3

## 2019-02-18 ASSESSMENT — PAIN SCALES - GENERAL: PAINLEVEL: MILD PAIN (3)

## 2019-02-18 NOTE — LETTER
2/18/2019       RE: Fili Padilla  [id number]  [facility] [address]     Dear Colleague,    Thank you for referring your patient, Fili Padilla, to the [facility] UROLOGY AND INST FOR PROSTATE AND UROLOGIC CANCERS at [facility]. Please see a copy of my visit note below.    Service Date: 02/18/2019      REASON FOR VISIT TODAY:  Prostate cancer.      HISTORY OF PRESENT ILLNESS:  Mr. Padilla is a 63-year-old gentleman followed in our clinic for history of prostate cancer for which the patient was on active surveillance.  The patient presented recently for a surveillance biopsy on 02/11/2019.  He comes in today noting that by and large he did well following the biopsy.  He does still note a tiny bit of blood at the beginning of his urinary stream, but this is just for the first half second or so voiding.  He also notes some persistent discomfort in the perineum, but overall, the patient feels that these symptoms are getting better.      PHYSICAL EXAMINATION:  On exam today, the patient's blood pressure is 140/86, pulse is 62.  He is in no acute distress.      The patient's prostate biopsy from 02/11/2019 shows [place] 4 + 3 equals 7 in 70% of 1 out of 2 cores from the left base as well as Carina 3 + 3 equals 6 in various percentages of cores from 5% up to 50% scattered throughout most of the other areas of the prostate.      ASSESSMENT AND PLAN:  Over half of today's hour-long visit was spent counseling the patient and his wife regarding his prostate cancer.  I suggested to Mr. Padilla that by virtue of the fact that we now see [place] 4 + 3 equals 7, he has intermediate risk prostate cancer at this point.  We discussed that at this point there is concern for disease progression and that we would not been enthusiastic about continued observation for the patient given his otherwise young age and otherwise good health.  We again discussed options for definitive  therapy including surgery, both open and robotically assisted laparoscopic approaches, as well as radiation therapy in the form of external beam with adjuvant hormone therapy, brachytherapy or proton beam therapy.  We also briefly touched on high intensity focused ultrasound and cryotherapy.  I have again suggested the patient meet with one of our radiation oncology doctors as he has not done so for the last couple of years to hear more about radiation as a potential option.  We then again plan see the patient back in a another month or so to answer further questions and make further decisions at that time.         EMILY TERRY MD             D: 2019   T: 2019   MT: christopher      Name:     MARCI PACE   MRN:      [id number]        Account:      [id number]   :      1955           Service Date: 2019      Document: [id number]

## 2019-02-19 NOTE — PROGRESS NOTES
Service Date: 02/18/2019      REASON FOR VISIT TODAY:  Prostate cancer.      HISTORY OF PRESENT ILLNESS:  Mr. Padilla is a 63-year-old gentleman followed in our clinic for history of prostate cancer for which the patient was on active surveillance.  The patient presented recently for a surveillance biopsy on 02/11/2019.  He comes in today noting that by and large he did well following the biopsy.  He does still note a tiny bit of blood at the beginning of his urinary stream, but this is just for the first half second or so voiding.  He also notes some persistent discomfort in the perineum, but overall, the patient feels that these symptoms are getting better.      PHYSICAL EXAMINATION:  On exam today, the patient's blood pressure is 140/86, pulse is 62.  He is in no acute distress.      The patient's prostate biopsy from 02/11/2019 shows Carina 4 + 3 equals 7 in 70% of 1 out of 2 cores from the left base as well as Carina 3 + 3 equals 6 in various percentages of cores from 5% up to 50% scattered throughout most of the other areas of the prostate.      ASSESSMENT AND PLAN:  Over half of today's hour-long visit was spent counseling the patient and his wife regarding his prostate cancer.  I suggested to Mr. Padilla that by virtue of the fact that we now see [place] 4 + 3 equals 7, he has intermediate risk prostate cancer at this point.  We discussed that at this point there is concern for disease progression and that we would not been enthusiastic about continued observation for the patient given his otherwise young age and otherwise good health.  We again discussed options for definitive therapy including surgery, both open and robotically assisted laparoscopic approaches, as well as radiation therapy in the form of external beam with adjuvant hormone therapy, brachytherapy or proton beam therapy.  We also briefly touched on high intensity focused ultrasound and cryotherapy.  I have again suggested the  patient meet with one of our radiation oncology doctors as he has not done so for the last couple of years to hear more about radiation as a potential option.  We then again plan see the patient back in a another month or so to answer further questions and make further decisions at that time.         EMILY TERRY MD             D: 2019   T: 2019   MT: christopher      Name:     MARCI PACE   MRN:      [id number]        Account:      [id number]   :      1955           Service Date: 2019      Document: [id number]

## 2019-03-11 DIAGNOSIS — H35.371 EPIRETINAL MEMBRANE (ERM) OF RIGHT EYE: Primary | ICD-10-CM

## 2019-03-13 ENCOUNTER — OFFICE VISIT (OUTPATIENT)
Dept: OPHTHALMOLOGY | Facility: CLINIC | Age: 64
End: 2019-03-13
Attending: OPHTHALMOLOGY
Payer: COMMERCIAL

## 2019-03-13 DIAGNOSIS — H35.371 EPIRETINAL MEMBRANE (ERM) OF RIGHT EYE: ICD-10-CM

## 2019-03-13 DIAGNOSIS — H25.13 NUCLEAR SENILE CATARACT OF BOTH EYES: Primary | ICD-10-CM

## 2019-03-13 PROCEDURE — G0463 HOSPITAL OUTPT CLINIC VISIT: HCPCS | Mod: ZF

## 2019-03-13 PROCEDURE — 92134 CPTRZ OPH DX IMG PST SGM RTA: CPT | Mod: ZF | Performed by: OPHTHALMOLOGY

## 2019-03-13 ASSESSMENT — REFRACTION_WEARINGRX
SPECS_TYPE: PAL
OD_AXIS: 014
OD_SPHERE: -3.00
OS_AXIS: 007
OS_ADD: +2.50
OD_ADD: +2.50
OS_SPHERE: -3.00
OS_CYLINDER: +0.50
OD_CYLINDER: +1.50

## 2019-03-13 ASSESSMENT — CONF VISUAL FIELD
OS_NORMAL: 1
OD_SUPERIOR_NASAL_RESTRICTION: 3

## 2019-03-13 ASSESSMENT — EXTERNAL EXAM - RIGHT EYE: OD_EXAM: NORMAL

## 2019-03-13 ASSESSMENT — SLIT LAMP EXAM - LIDS
COMMENTS: NORMAL
COMMENTS: NORMAL

## 2019-03-13 ASSESSMENT — TONOMETRY
IOP_METHOD: TONOPEN
OD_IOP_MMHG: 17
OS_IOP_MMHG: 19

## 2019-03-13 ASSESSMENT — CUP TO DISC RATIO
OS_RATIO: 0.4
OD_RATIO: 0.4

## 2019-03-13 ASSESSMENT — EXTERNAL EXAM - LEFT EYE: OS_EXAM: NORMAL

## 2019-03-13 ASSESSMENT — VISUAL ACUITY
OD_CC: 20/40
METHOD: SNELLEN - LINEAR
CORRECTION_TYPE: GLASSES
OS_CC: 20/15
OD_CC+: +2

## 2019-03-13 NOTE — NURSING NOTE
Chief Complaints and History of Present Illnesses   Patient presents with     Follow Up     6 month follow up Epiretinal membrane (ERM) of right eye       Chief Complaint(s) and History of Present Illness(es)     Follow Up     Associated symptoms: Negative for eye pain    Comments: 6 month follow up Epiretinal membrane (ERM) of right eye                Comments     Pt states vision has been good since last visit. No visual complaints today. No flashes or floaters.  Dryness in both eyes, relief with drops.    Noel DEJESUS March 13, 2019 9:03 AM

## 2019-03-13 NOTE — PROGRESS NOTES
I have confirmed the patient's and reviewed Past Medical History, Past Surgical History, Social History, Family History, Problem List, Medication List and agree with Tech note.    CC: Vision changes right eye    HPI: Fili Padilla is a 63 year old male who presents for right eye Epiretinal membrane evaluation. Was seen by OLIVIER Liu 2 years prior and was offered surgery but declined surgery at the time. Noticed right eye vision worsening over the past month.    Right eye vision is stable. Not noticing any meteamorphopsia.    Retinal Imaging:  OCT 3-13-19  RE: Epiretinal membrane with minimal macular edema  LE: Normal, PHF detached    Assessment and Plan:  1. Epiretinal membrane (ERM) of right eye  (primary encounter diagnosis)  - VA 20/40+, monitor for now    2. Nuclear senile cataract of both eyes  - Mild, can monitor for now      Return to clinic: return to clinic in 6 months with OCT    Deedee Paredes MD  PGY-3 Ophthalmology    ATTESTATION:  I have seen and examined the patient with   and agree with the findings in this note, as well as the interpretations of the diagnostic tests.    Puneet Bocanegra MD PhD.  Professor & Chair

## 2019-08-20 ENCOUNTER — TELEPHONE (OUTPATIENT)
Dept: OPHTHALMOLOGY | Facility: CLINIC | Age: 64
End: 2019-08-20

## 2019-09-05 ENCOUNTER — OFFICE VISIT (OUTPATIENT)
Dept: OPHTHALMOLOGY | Facility: CLINIC | Age: 64
End: 2019-09-05
Attending: OPHTHALMOLOGY
Payer: COMMERCIAL

## 2019-09-05 DIAGNOSIS — H35.371 EPIRETINAL MEMBRANE (ERM) OF RIGHT EYE: Primary | ICD-10-CM

## 2019-09-05 DIAGNOSIS — H25.13 NUCLEAR SENILE CATARACT OF BOTH EYES: ICD-10-CM

## 2019-09-05 PROCEDURE — G0463 HOSPITAL OUTPT CLINIC VISIT: HCPCS | Mod: ZF

## 2019-09-05 PROCEDURE — 92134 CPTRZ OPH DX IMG PST SGM RTA: CPT | Mod: ZF | Performed by: OPHTHALMOLOGY

## 2019-09-05 ASSESSMENT — SLIT LAMP EXAM - LIDS
COMMENTS: NORMAL
COMMENTS: NORMAL

## 2019-09-05 ASSESSMENT — REFRACTION_WEARINGRX
SPECS_TYPE: PAL
OD_SPHERE: -3.00
OS_SPHERE: -3.00
OD_ADD: +2.50
OS_CYLINDER: +0.50
OD_CYLINDER: +1.50
OS_AXIS: 007
OS_ADD: +2.50
OD_AXIS: 014

## 2019-09-05 ASSESSMENT — VISUAL ACUITY
METHOD: SNELLEN - LINEAR
OD_CC+: -2
OD_CC: 20/30
OS_CC: 20/20
CORRECTION_TYPE: GLASSES

## 2019-09-05 ASSESSMENT — TONOMETRY
IOP_METHOD: TONOPEN
OD_IOP_MMHG: 21
OS_IOP_MMHG: 21

## 2019-09-05 ASSESSMENT — CUP TO DISC RATIO
OS_RATIO: 0.4
OD_RATIO: 0.4

## 2019-09-05 ASSESSMENT — EXTERNAL EXAM - RIGHT EYE: OD_EXAM: NORMAL

## 2019-09-05 ASSESSMENT — EXTERNAL EXAM - LEFT EYE: OS_EXAM: NORMAL

## 2019-09-05 ASSESSMENT — CONF VISUAL FIELD: OS_NORMAL: 1

## 2019-09-05 NOTE — PROGRESS NOTES
I have confirmed the patient's and reviewed Past Medical History, Past Surgical History, Social History, Family History, Problem List, Medication List and agree with Tech note.    CC: Vision changes right eye    HPI: Fili Padilla is a 64 year old male who presents for right eye Epiretinal membrane evaluation. Was seen by OLIVIER Liu 2 years prior and was offered surgery but declined surgery at the time. Noticed right eye vision worsening over the past month.    Right eye vision is stable. Not noticing any meteamorphopsia.    Retinal Imaging:  OCT 9-05-19  RE: Epiretinal membrane with minimal macular edema  LE: Normal, PHF detached    Assessment and Plan:  1. Epiretinal membrane (ERM) of right eye  (primary encounter diagnosis)  - VA 20/40+, monitor for now    2. Nuclear senile cataract of both eyes  - Mild, can monitor for now      Return to clinic: return to clinic in 9 months with OCT      Puneet Bocanegra MD PhD.  Professor & Chair

## 2019-09-05 NOTE — NURSING NOTE
Chief Complaints and History of Present Illnesses   Patient presents with     Follow Up     Nuclear senile cataract of both eyes     Chief Complaint(s) and History of Present Illness(es)     Follow Up     Laterality: both eyes    Associated symptoms: dryness.  Negative for tearing, eye pain and redness    Pain scale: 0/10    Comments: Nuclear senile cataract of both eyes              Comments     Both eyes have seemed tired lately.  His vision has seemed stable or slightly decreased in both eyes.  He states that he has been under some stress from having prostate cancer surgery and skin cancer leisons removed from his forehead.    Both eyes seem intermittently dry.  Using artificial tears does resolve the discomfort.     DEANA Toussaint 9:08 AM  September 5, 2019

## 2019-09-29 ENCOUNTER — HEALTH MAINTENANCE LETTER (OUTPATIENT)
Age: 64
End: 2019-09-29

## 2020-03-15 ENCOUNTER — HEALTH MAINTENANCE LETTER (OUTPATIENT)
Age: 65
End: 2020-03-15

## 2020-05-26 ENCOUNTER — COMMUNICATION - HEALTHEAST (OUTPATIENT)
Dept: SCHEDULING | Facility: CLINIC | Age: 65
End: 2020-05-26

## 2020-06-08 ENCOUNTER — OFFICE VISIT (OUTPATIENT)
Dept: OPHTHALMOLOGY | Facility: CLINIC | Age: 65
End: 2020-06-08
Attending: OPHTHALMOLOGY
Payer: COMMERCIAL

## 2020-06-08 DIAGNOSIS — H35.371 EPIRETINAL MEMBRANE (ERM) OF RIGHT EYE: ICD-10-CM

## 2020-06-08 PROCEDURE — G0463 HOSPITAL OUTPT CLINIC VISIT: HCPCS | Mod: ZF

## 2020-06-08 PROCEDURE — 92134 CPTRZ OPH DX IMG PST SGM RTA: CPT | Mod: ZF | Performed by: OPHTHALMOLOGY

## 2020-06-08 RX ORDER — AMLODIPINE BESYLATE 2.5 MG/1
5 TABLET ORAL DAILY
COMMUNITY
Start: 2020-03-22

## 2020-06-08 ASSESSMENT — REFRACTION_WEARINGRX
OS_CYLINDER: +0.50
OS_ADD: +2.50
SPECS_TYPE: PAL
OD_CYLINDER: +1.50
OS_SPHERE: -3.00
OS_AXIS: 007
OD_AXIS: 014
OD_ADD: +2.50
OD_SPHERE: -3.00

## 2020-06-08 ASSESSMENT — EXTERNAL EXAM - RIGHT EYE: OD_EXAM: NORMAL

## 2020-06-08 ASSESSMENT — VISUAL ACUITY
OD_CC: 20/50
METHOD: SNELLEN - LINEAR
OD_CC+: +2
OS_CC: 20/20

## 2020-06-08 ASSESSMENT — CUP TO DISC RATIO
OS_RATIO: 0.4
OD_RATIO: 0.4

## 2020-06-08 ASSESSMENT — SLIT LAMP EXAM - LIDS
COMMENTS: NORMAL
COMMENTS: NORMAL

## 2020-06-08 ASSESSMENT — CONF VISUAL FIELD
METHOD: COUNTING FINGERS
OS_NORMAL: 1

## 2020-06-08 ASSESSMENT — TONOMETRY
OS_IOP_MMHG: 15
IOP_METHOD: TONOPEN
OD_IOP_MMHG: 17

## 2020-06-08 ASSESSMENT — EXTERNAL EXAM - LEFT EYE: OS_EXAM: NORMAL

## 2020-06-08 NOTE — PROGRESS NOTES
I have confirmed the patient's and reviewed Past Medical History, Past Surgical History, Social History, Family History, Problem List, Medication List and agree with Tech note.    CC: Vision changes right eye    HPI: Fili Padilla is a 64 year old male who presents for right eye Epiretinal membrane evaluation. Was seen by OLIVIER Liu 2 years prior and was offered surgery but declined surgery at the time. Noticed right eye vision worsening over the past month.    Right eye vision is stable. Not noticing any meteamorphopsia.    Retinal Imaging:  OCT 6/8/2020  RE: Epiretinal membrane with minimal macular edema; no significant change compared to previous   LE: Normal, PHF detached    Assessment and Plan:  1. Epiretinal membrane (ERM) of right eye  - no significant change in vision;   - VA 20/50 2+, monitor for now    2. Nuclear senile cataract of both eyes  - Mild, can monitor for now      Return to clinic: return to clinic in 12 months with OCT    João Cee MD  Vitreoretinal surgery fellow  [facility]    Attestation:  I have seen and examined the patient with Dr. Cee and agree with the findings in this note, as well as the interpretations of the diagnostic tests.        Puneet Bocanegra MD PhD.  Professor & Chair

## 2020-06-08 NOTE — NURSING NOTE
Chief Complaints and History of Present Illnesses   Patient presents with     Follow Up     Chief Complaint(s) and History of Present Illness(es)     Follow Up     Laterality: right eye    Associated symptoms: itching, dryness and floaters.  Negative for eye pain and flashes    Pain scale: 0/10              Comments     Fili is here for a follow up of Epiretinal membrane (ERM) of right eye. He feels vision is stable.     Romario Sosa COT 3:18 PM June 8, 2020

## 2020-08-29 ENCOUNTER — HOSPITAL ENCOUNTER (EMERGENCY)
Facility: CLINIC | Age: 65
Discharge: HOME | End: 2020-08-29
Attending: INTERNAL MEDICINE | Admitting: INTERNAL MEDICINE
Payer: COMMERCIAL

## 2020-08-29 VITALS
DIASTOLIC BLOOD PRESSURE: 88 MMHG | HEART RATE: 67 BPM | OXYGEN SATURATION: 99 % | SYSTOLIC BLOOD PRESSURE: 145 MMHG | TEMPERATURE: 98.6 F | RESPIRATION RATE: 16 BRPM

## 2020-08-29 DIAGNOSIS — H43.392 VITREOUS FLOATERS OF LEFT EYE: ICD-10-CM

## 2020-08-29 PROCEDURE — 99282 EMERGENCY DEPT VISIT SF MDM: CPT

## 2020-08-29 RX ORDER — TETRACAINE HYDROCHLORIDE 5 MG/ML
SOLUTION OPHTHALMIC
Status: DISCONTINUED
Start: 2020-08-29 | End: 2020-08-29 | Stop reason: HOSPADM

## 2020-08-29 NOTE — ED PROVIDER NOTES
"  History     Chief Complaint:  Eye Problem         Fili Padilla is a 65 year old male who presents for evaluation of an eye problem. The patient reports this morning he noticed a \"big, rounded blob\" that is light grey colored in his left eye, distorting his vision. He states he has also be noticing \"dark floaters\" in the periphery of the left eye. He notes for the past week or so he has had some light flashes in the eye. He follows with Dr. Jorje Cm at [facility] in [place]. Of note, he states he has an additional retinal membrane in his right eye that distorts his vision and follows with Dr. Guy for this issue.     Allergies:  Codeine      Medications:    Amlodipine   Aspirin     Past Medical History:    Prostate cancer    Past Surgical History:    Prostate surgery  Skin cancer excision forehead    Family History:    Macular degeneration  Diabetes     Social History:  Smoking status- never smoker  Alcohol use- no  Drug use- no   Marital Status:   [2]     Review of Systems   Eyes: Positive for visual disturbance.   All other systems reviewed and are negative.      Physical Exam     Patient Vitals for the past 24 hrs:   Temp Temp src Pulse Resp SpO2   08/29/20 1133 98.6  F (37  C) Oral 67 16 99 %       Physical Exam  Eyes:      Conjunctiva/sclera: Conjunctivae normal.      Right eye: No chemosis.     Left eye: No chemosis.     Pupils: Pupils are equal, round, and reactive to light.      Slit lamp exam:     Right eye: No foreign body.      Left eye: No foreign body.      Comments: No photophobia.  Direct ophthalmoscopy including use of panoptic shows a possible abnormality at the very lowest part of the fundus in the left eye.   Neurological:      Mental Status: He is alert.   Psychiatric:         Behavior: Behavior is cooperative.         Emergency Department Course     Emergency Department Course:  Past medical records, nursing notes, and vitals reviewed.    1141 I performed an exam " of the patient as documented above.     1207 I spoke to Dr. Barrera of ophthalmology regarding the patient's case     1215 I rechecked the patient and discussed the results of his workup thus far.     Findings and plan explained to the Patient. Patient discharged home with instructions regarding supportive care, medications, and reasons to return. The importance of close follow-up was reviewed.     Impression & Plan       Medical Decision Making:    Fili Padilla is a 65 year old male who presents with a worrisome history of flashers and floaters in the left eye and now with a large gray area in his central vision on the left.  Despite this he did not have any visual field cuts and does not have any areas of absent vision.  As noted I spoke with ophthalmology.  He thought that his history was not highly suggestive of retinal detachment but more likely posterior vitreous detachment.  He has kindly offered to see the patient tomorrow.  I will have the patient go to the ER at the [facility] today if symptoms increase or he does develop an absent area in vision, otherwise he will meet Dr. Barrera tomorrow morning for ophthalmologic evaluation.    Diagnosis:    ICD-10-CM   1. Vitreous floaters of left eye  H43.392     Disposition:  Discharged to home.    Scribe Disclosure:  IFaviloa, am serving as a scribe at 11:36 AM on 8/29/2020 to document services personally performed by Paloma Arora MD based on my observations and the provider's statements to me.        Paloma Arora MD  08/29/20 6827

## 2020-08-29 NOTE — DISCHARGE INSTRUCTIONS
[facility] if dark spot near vision enlarges, more floaters or flashers, blank spots in the vision or other problems.

## 2020-08-29 NOTE — ED TRIAGE NOTES
"Patient presents with complaints of vision changes in left eye. Patient describes \"floaters and a blob\". Patient denies any pain or vision changes. ABC intact without need for intervention at this time.     "

## 2020-08-29 NOTE — ED AVS SNAPSHOT
[facility] Emergency Department  [address]  [facility] [address]  Phone:  [phone]  Fax:  [phone]                                    Fili Padilla   MRN: [id number]    Department:  [facility] Emergency Department   Date of Visit:  8/29/2020           After Visit Summary Signature Page    I have received my discharge instructions, and my questions have been answered. I have discussed any challenges I see with this plan with the nurse or doctor.    ..........................................................................................................................................  Patient/Patient Representative Signature      ..........................................................................................................................................  Patient Representative Print Name and Relationship to Patient    ..................................................               ................................................  Date                                   Time    ..........................................................................................................................................  Reviewed by Signature/Title    ...................................................              ..............................................  Date                                               Time          22EPIC Rev 08/18

## 2020-08-30 ENCOUNTER — OFFICE VISIT (OUTPATIENT)
Dept: OPHTHALMOLOGY | Facility: CLINIC | Age: 65
End: 2020-08-30
Payer: COMMERCIAL

## 2020-08-30 DIAGNOSIS — H43.812 POSTERIOR VITREOUS DETACHMENT OF LEFT EYE: Primary | ICD-10-CM

## 2020-08-30 ASSESSMENT — TONOMETRY
OS_IOP_MMHG: 21
IOP_METHOD: TONOPEN
OD_IOP_MMHG: 14

## 2020-08-30 ASSESSMENT — VISUAL ACUITY
METHOD: SNELLEN - LINEAR
CORRECTION_TYPE: GLASSES
OD_CC: 20/50
OS_CC: 20/20
OD_CC+: +2

## 2020-08-30 ASSESSMENT — EXTERNAL EXAM - LEFT EYE: OS_EXAM: NORMAL

## 2020-08-30 ASSESSMENT — SLIT LAMP EXAM - LIDS
COMMENTS: NORMAL
COMMENTS: NORMAL

## 2020-08-30 ASSESSMENT — CUP TO DISC RATIO
OD_RATIO: 0.4
OS_RATIO: 0.4

## 2020-08-30 ASSESSMENT — CONF VISUAL FIELD
OD_NORMAL: 1
OS_NORMAL: 1

## 2020-08-30 ASSESSMENT — EXTERNAL EXAM - RIGHT EYE: OD_EXAM: NORMAL

## 2020-08-30 NOTE — PROGRESS NOTES
OPHTHALMOLOGY CONSULT NOTE  08/30/20    Patient: Fili Padilla      HISTORY OF PRESENTING ILLNESS:     Fili Padilla is a 65 year old male who noticed flashes a and psychedelic appearence in his left eye yesterday. He presented to the ED and had follow up set up with ophthalmology for the following morning.     He has been seeing a little more floaters in the left eye with intermittent flashes in the left eye in the past few days.     Patient of Dr. Yarbrough, following for epiretinal membrane in the right eye.   No history of eye surgery.  No history of eye trauma.     10+ review of systems were otherwise negative except for that which has been stated above.      OCULAR/MEDICAL/SURGICAL HISTORIES:     Past Ocular History:  NS each eye  ERM right eye follows with Dr. Yarbrough      Past Medical History:   Diagnosis Date     Prostate cancer (H)        Past Surgical History:   Procedure Laterality Date     PROSTATE SURGERY  05/2019     SURGICAL HISTORY OF -       skin cancer excision forehead       EXAMINATION:     Base Eye Exam     Visual Acuity (Snellen - Linear)       Right Left    Dist cc 20/50 +2 20/20    Correction:  Glasses          Tonometry (Tonopen, 10:10 AM)       Right Left    Pressure 14 21          Pupils       Pupils    Right PERRL    Left PERRL          Visual Fields       Left Right     Full Full          Extraocular Movement       Right Left     Full Full          Neuro/Psych     Oriented x3:  Yes    Mood/Affect:  Normal          Dilation     Both eyes:  1.0% Mydriacyl, 2.5% Sd Synephrine @ 10:10 AM            Slit Lamp and Fundus Exam     External Exam       Right Left    External Normal Normal          Slit Lamp Exam       Right Left    Lids/Lashes Normal Normal    Conjunctiva/Sclera White and quiet White and quiet    Cornea Clear Clear    Anterior Chamber Deep and quiet Deep and quiet    Iris Dilated Dilated    Lens Nuclear sclerosis cataract, trace posterior pole cataract Nuclear  sclerosis cataract    Vitreous Normal Posterior vitreous detachment, negative Victor Hugo's sign, central vitreous floaters.           Fundus Exam       Right Left    Disc Normal Normal    C/D Ratio 0.4 0.4    Macula ERM, loss of foveal reflex Normal    Vessels Normal Normal    Periphery Normal Normal                Labs/Studies/Imaging Performed  None      ASSESSMENT/PLAN:     Fili Padilla is a 65 year old male who presents with new floaters and flashes in the left eye.     Encounter Diagnosis   Name Primary?     Posterior vitreous detachment of left eye Yes     No field cuts, negative Victor Hugo sign, no signs of RD or tear on exam.   Plan   - follow up in 4 weeks with ophthalmology for dilated fundus exam.     It is our pleasure to participate in this patient's care and treatment. Please contact us with any further questions or concerns.    Discussed with Dr. Ann Marie Desouza.     Maribel Barrera MD  Ophthalmology PGY2  [facility]  Pager: [phone]    Teaching Statement  I did not examine the patient, but was available to see the patient if needed. I have discussed the case with the resident and the assessment and plan as documented seems reasonable to me.      Marycarmen Mckee MD  Comprehensive Ophthalmology & Ocular Pathology  Department of Ophthalmology and Visual Neurosciences  [email or web address]  Pager [phone]

## 2020-09-01 ENCOUNTER — TELEPHONE (OUTPATIENT)
Dept: OPHTHALMOLOGY | Facility: CLINIC | Age: 65
End: 2020-09-01

## 2020-09-01 NOTE — TELEPHONE ENCOUNTER
Pt to f/u in 4 weeks per on call for vitreous detachment eval following 8- ED visit    Pt prefers to see Dr. Murillo    Scheduled sept 28th with Dr. Murillo-- pt aware of date/time/location    Pt aware to call for any new floaters, new flashing and or vision changes    Reviewed ok to use OTC lubricating eye drops PRN-- reviewed to stay away from 'get the red out drops'.    Pt verbally demonstrated understanding    Praneeth Guerrero RN 10:09 AM 09/01/20

## 2020-09-28 ENCOUNTER — OFFICE VISIT (OUTPATIENT)
Dept: OPHTHALMOLOGY | Facility: CLINIC | Age: 65
End: 2020-09-28
Attending: OPHTHALMOLOGY
Payer: COMMERCIAL

## 2020-09-28 DIAGNOSIS — H43.812 POSTERIOR VITREOUS DETACHMENT OF LEFT EYE: Primary | ICD-10-CM

## 2020-09-28 DIAGNOSIS — H25.13 NUCLEAR SENILE CATARACT OF BOTH EYES: ICD-10-CM

## 2020-09-28 DIAGNOSIS — H35.371 EPIRETINAL MEMBRANE (ERM) OF RIGHT EYE: ICD-10-CM

## 2020-09-28 PROCEDURE — G0463 HOSPITAL OUTPT CLINIC VISIT: HCPCS | Mod: ZF

## 2020-09-28 ASSESSMENT — REFRACTION_WEARINGRX
OS_ADD: +2.50
OS_SPHERE: -3.00
OS_AXIS: 007
OD_CYLINDER: +1.50
OD_SPHERE: -3.00
SPECS_TYPE: PAL
OS_CYLINDER: +0.50
OD_ADD: +2.50
OD_AXIS: 014

## 2020-09-28 ASSESSMENT — SLIT LAMP EXAM - LIDS
COMMENTS: NORMAL
COMMENTS: NORMAL

## 2020-09-28 ASSESSMENT — VISUAL ACUITY
METHOD: SNELLEN - LINEAR
OS_CC: 20/20
OD_CC: 20/40
CORRECTION_TYPE: GLASSES

## 2020-09-28 ASSESSMENT — TONOMETRY
IOP_METHOD: TONOPEN
OD_IOP_MMHG: 19
OS_IOP_MMHG: 21

## 2020-09-28 ASSESSMENT — CONF VISUAL FIELD
OS_NORMAL: 1
OD_NORMAL: 1

## 2020-09-28 ASSESSMENT — EXTERNAL EXAM - LEFT EYE: OS_EXAM: NORMAL

## 2020-09-28 ASSESSMENT — EXTERNAL EXAM - RIGHT EYE: OD_EXAM: NORMAL

## 2020-09-28 ASSESSMENT — CUP TO DISC RATIO
OD_RATIO: 0.4
OS_RATIO: 0.4

## 2020-09-28 NOTE — PROGRESS NOTES
I have confirmed the patient's and reviewed Past Medical History, Past Surgical History, Social History, Family History, Problem List, Medication List and agree with Tech note.    CC: Vision changes right eye    HPI: Fili Padilla is a 65 year old male who presents for right eye Epiretinal membrane evaluation. Was seen by OLIVIER Liu 2 years prior and was offered surgery but declined surgery at the time. Noticed right eye vision worsening over the past month.    Right eye vision is stable. Not noticing any meteamorphopsia.    Retinal Imaging:  OCT 6/8/2020  RE: Epiretinal membrane with minimal macular edema; no significant change compared to previous   LE: Normal, PHF detached    Assessment and Plan:  1. Epiretinal membrane (ERM) of right eye  - no significant change in vision;   - VA 20/50 2+, monitor for now    2. Nuclear senile cataract of both eyes  - Mild, can monitor for now    3.  Posterior vitreous detachment (PVD) left eye    - grey blob gone away    - less symptoms compared to last month    Return to clinic: return to clinic in 9 months with OCT      Puneet Bocanegra MD PhD.  Professor & Chair

## 2020-11-10 ENCOUNTER — TRANSFERRED RECORDS (OUTPATIENT)
Dept: HEALTH INFORMATION MANAGEMENT | Facility: CLINIC | Age: 65
End: 2020-11-10
Payer: COMMERCIAL

## 2021-01-14 ENCOUNTER — HEALTH MAINTENANCE LETTER (OUTPATIENT)
Age: 66
End: 2021-01-14

## 2021-03-06 ENCOUNTER — IMMUNIZATION (OUTPATIENT)
Dept: NURSING | Facility: CLINIC | Age: 66
End: 2021-03-06
Payer: COMMERCIAL

## 2021-03-06 PROCEDURE — 91303 PR COVID VAC JANSSEN AD26 0.5ML: CPT

## 2021-03-06 PROCEDURE — 0031A PR COVID VAC JANSSEN AD26 0.5ML: CPT

## 2021-05-09 ENCOUNTER — HEALTH MAINTENANCE LETTER (OUTPATIENT)
Age: 66
End: 2021-05-09

## 2021-06-02 DIAGNOSIS — H35.371 EPIRETINAL MEMBRANE (ERM) OF RIGHT EYE: Primary | ICD-10-CM

## 2021-06-08 NOTE — TELEPHONE ENCOUNTER
Triage Call  Had virtual visit yesterday for deer tick bite - given doxycycline for prophylactic  Reports taken at 1600  Last night around 2200 found another tick, was a little more difficult to remove  First one was on left forearm- 1cm pink  Second left leg about 4in from groin  Used alcohol and ice  Called mele devi, virtual visit with Dr Noble at [facility]  Patient was referred back to [facility] to follow up with provider     Reason for Disposition    Prevention of tick bites and insect repellents (e.g., DEET), questions about    Protocols used: TICK BITE-A-[place]    COVID 19 Nurse Triage Plan/Patient Instructions    Please be aware that novel coronavirus (COVID-19) may be circulating in the community. If you develop symptoms such as fever, cough, or SOB or if you have concerns about the presence of another infection including coronavirus (COVID-19), please contact your health care provider or visit [email or web address].     Disposition/Instructions    Patient to stay at home and follow home care protocol based instructions.    Thank you for limiting contact with others, wearing a simple mask to cover your cough, practice good hand hygiene habits and accessing our virtual services where possible to limit the spread of this virus.    For more information about COVID19 and options for caring for yourself at home, please visit the CDC website at https://www.cdc.gov/coronavirus/2019-ncov/about/steps-when-sick.html  For more options for care at [facility], please visit our website at [email or web address]    For more information, please use the [place] Department of Health COVID-19 Website: https://www.health.state.[place]/diseases/coronavirus/index.html  [place] Department of Health ([facility]) COVID-19 Hotlines (Interpreters available):      Health questions: Phone Number: [phone] or 1-532.557.7808 and Hours: 7 a.m. to 7 p.m.    Schools and  questions: Phone Number:  [phone] or [phone] and Hours 7 a.m. to 7 p.m.      Margaret Locke RN [facility] 5/26/2020 10:23 AM

## 2021-10-24 ENCOUNTER — HEALTH MAINTENANCE LETTER (OUTPATIENT)
Age: 66
End: 2021-10-24

## 2021-12-17 ENCOUNTER — TRANSFERRED RECORDS (OUTPATIENT)
Dept: HEALTH INFORMATION MANAGEMENT | Facility: CLINIC | Age: 66
End: 2021-12-17

## 2021-12-17 ENCOUNTER — APPOINTMENT (OUTPATIENT)
Dept: GENERAL RADIOLOGY | Facility: CLINIC | Age: 66
End: 2021-12-17
Attending: EMERGENCY MEDICINE
Payer: COMMERCIAL

## 2021-12-17 ENCOUNTER — HOSPITAL ENCOUNTER (EMERGENCY)
Facility: CLINIC | Age: 66
Discharge: HOME | End: 2021-12-17
Attending: EMERGENCY MEDICINE | Admitting: EMERGENCY MEDICINE
Payer: COMMERCIAL

## 2021-12-17 VITALS
DIASTOLIC BLOOD PRESSURE: 99 MMHG | HEART RATE: 59 BPM | SYSTOLIC BLOOD PRESSURE: 123 MMHG | TEMPERATURE: 98.2 F | RESPIRATION RATE: 18 BRPM | OXYGEN SATURATION: 96 %

## 2021-12-17 DIAGNOSIS — R07.9 CHEST PAIN, UNSPECIFIED TYPE: ICD-10-CM

## 2021-12-17 LAB
ANION GAP SERPL CALCULATED.3IONS-SCNC: 7 MMOL/L (ref 3–14)
ATRIAL RATE - MUSE: 60 BPM
BASOPHILS # BLD AUTO: 0 10E3/UL (ref 0–0.2)
BASOPHILS NFR BLD AUTO: 1 %
BUN SERPL-MCNC: 23 MG/DL (ref 7–30)
CALCIUM SERPL-MCNC: 8.9 MG/DL (ref 8.5–10.1)
CHLORIDE BLD-SCNC: 110 MMOL/L (ref 94–109)
CO2 SERPL-SCNC: 24 MMOL/L (ref 20–32)
CREAT SERPL-MCNC: 0.76 MG/DL (ref 0.66–1.25)
DIASTOLIC BLOOD PRESSURE - MUSE: NORMAL MMHG
EOSINOPHIL # BLD AUTO: 0 10E3/UL (ref 0–0.7)
EOSINOPHIL NFR BLD AUTO: 0 %
ERYTHROCYTE [DISTWIDTH] IN BLOOD BY AUTOMATED COUNT: 12.6 % (ref 10–15)
GFR SERPL CREATININE-BSD FRML MDRD: >90 ML/MIN/1.73M2
GLUCOSE BLD-MCNC: 106 MG/DL (ref 70–99)
HCT VFR BLD AUTO: 49.9 % (ref 40–53)
HGB BLD-MCNC: 16.3 G/DL (ref 13.3–17.7)
IMM GRANULOCYTES # BLD: 0 10E3/UL
IMM GRANULOCYTES NFR BLD: 0 %
INTERPRETATION ECG - MUSE: NORMAL
LYMPHOCYTES # BLD AUTO: 1.8 10E3/UL (ref 0.8–5.3)
LYMPHOCYTES NFR BLD AUTO: 38 %
MCH RBC QN AUTO: 30.4 PG (ref 26.5–33)
MCHC RBC AUTO-ENTMCNC: 32.7 G/DL (ref 31.5–36.5)
MCV RBC AUTO: 93 FL (ref 78–100)
MONOCYTES # BLD AUTO: 0.4 10E3/UL (ref 0–1.3)
MONOCYTES NFR BLD AUTO: 10 %
NEUTROPHILS # BLD AUTO: 2.4 10E3/UL (ref 1.6–8.3)
NEUTROPHILS NFR BLD AUTO: 51 %
NRBC # BLD AUTO: 0 10E3/UL
NRBC BLD AUTO-RTO: 0 /100
P AXIS - MUSE: 71 DEGREES
PLATELET # BLD AUTO: 188 10E3/UL (ref 150–450)
POTASSIUM BLD-SCNC: 4 MMOL/L (ref 3.4–5.3)
PR INTERVAL - MUSE: 182 MS
QRS DURATION - MUSE: 110 MS
QT - MUSE: 404 MS
QTC - MUSE: 404 MS
R AXIS - MUSE: 53 DEGREES
RBC # BLD AUTO: 5.37 10E6/UL (ref 4.4–5.9)
SODIUM SERPL-SCNC: 141 MMOL/L (ref 133–144)
SYSTOLIC BLOOD PRESSURE - MUSE: NORMAL MMHG
T AXIS - MUSE: 33 DEGREES
TROPONIN I SERPL HS-MCNC: 10 NG/L
VENTRICULAR RATE- MUSE: 60 BPM
WBC # BLD AUTO: 4.7 10E3/UL (ref 4–11)

## 2021-12-17 PROCEDURE — 93005 ELECTROCARDIOGRAM TRACING: CPT

## 2021-12-17 PROCEDURE — 80048 BASIC METABOLIC PNL TOTAL CA: CPT | Performed by: EMERGENCY MEDICINE

## 2021-12-17 PROCEDURE — 85004 AUTOMATED DIFF WBC COUNT: CPT | Performed by: EMERGENCY MEDICINE

## 2021-12-17 PROCEDURE — 36415 COLL VENOUS BLD VENIPUNCTURE: CPT | Performed by: EMERGENCY MEDICINE

## 2021-12-17 PROCEDURE — 99285 EMERGENCY DEPT VISIT HI MDM: CPT | Mod: 25

## 2021-12-17 PROCEDURE — 84484 ASSAY OF TROPONIN QUANT: CPT | Performed by: EMERGENCY MEDICINE

## 2021-12-17 PROCEDURE — 71046 X-RAY EXAM CHEST 2 VIEWS: CPT

## 2021-12-17 RX ORDER — MULTIVIT-MIN/IRON/FOLIC ACID/K 18-600-40
1000 CAPSULE ORAL DAILY
COMMUNITY

## 2021-12-17 ASSESSMENT — ENCOUNTER SYMPTOMS
FEVER: 0
COUGH: 0
SHORTNESS OF BREATH: 0

## 2021-12-17 NOTE — ED TRIAGE NOTES
Pt arrives from  referral with c/o chest pain x1 month. Pt reports left sided chest pain intermittently, he denies any current symptoms. Pt denies any associated SOB. EKG at  [facility], copy given in triage. Pt appears anxious in triage. ABCs intact.

## 2021-12-17 NOTE — PROGRESS NOTES
DISCHARGE                         No discharge date for patient encounter.  ----------------------------------------------------------------------------  Discharged to: Home  Via: private transportation  Accompanied by: Family  Discharge Instructions: diet, activity, medications, follow up appointments, when to call the MD, aftercare instructions.    Follow Up Appointments:information given  Belongings: All sent with pt  IV: d/c'd, intact  Telemetry: d/c'd  Pt exhibits understanding of above discharge instructions; all questions answered.    Discharge Paperwork: Verbal Signed, copied, and sent home with patient.

## 2021-12-17 NOTE — ED PROVIDER NOTES
History   Chief Complaint:  Chest Pain and Anxiety       The history is provided by the patient.      Fili Padilla is a 66 year old male with history of prostrate cancer and hyperlipidemia who presents with chest pain and anxiety. Yesterday when Fili was opening a jar, he experienced sudden chest pain. Otherwise, Fili denies any exacerbation with exertion as he has been able to cross-country ski lately with no chest pain. Since then, his chest pain has been intermittent which he rates a 4/10 on the pain scale. He went to urgent care this morning for evaluation of his symptoms and was recommended to visit the ED for further workup.     At bedside, Fili does not have any chest pain. No fever, cough, or shortness of breath. No leg swelling.       Review of Systems   Constitutional: Negative for fever.   Respiratory: Negative for cough and shortness of breath.    Cardiovascular: Positive for chest pain. Negative for leg swelling.   All other systems reviewed and are negative.    Allergies:  Codeine    Medications:  Amlodipine    aspirin 325 MG tablet  hypromellose-dextran   Kenalog  Vitamin D3    Past Medical History:     Prostrate cancer  Ureterolithiasis  Serrated adenoma of colon  Depressive disorder  Hyperlipidemia     Past Surgical History:    Prostrate surgery  Skin cancer excision forehead    Family History:    Macular degeneration  Diabetes    Social History:  Patient accompanied by wife  Tobacco: Negative  PCP: No Ref-Primary, Physician    Physical Exam     Patient Vitals for the past 24 hrs:   BP Temp Temp src Pulse Resp SpO2   12/17/21 1000 (!) 145/92 98.2  F (36.8  C) -- 59 9 --   12/17/21 0904 (!) 147/91 98.2  F (36.8  C) Temporal 63 18 100 %       Physical Exam  General: Patient is awake, alert and interactive when I enter the room  Head: The scalp, face, and head appear normal  Eyes: The pupils are equal, round, and reactive to light. Conjunctivae and sclerae are normal  Neck: Normal range of  motion.  CV: Regular rate and rhythm. Peripheral pulses including radial pulses are symmetric.   Resp: Lungs are clear without wheezes or rales. No respiratory distress.   GI: Abdomen is soft, no rigidity, guarding, or rebound. No distension. No tenderness to palpation in any quadrant.     MS: Chest wall is non tender to palpation. No asymmetric leg swelling, calf or thigh tenderness.    Skin: No rash or lesions noted. Normal capillary refill noted  Neuro: Speech is normal and fluent. Face is symmetric. Moving all extremities.   Psych:  Normal affect.  Appropriate interactions.    Emergency Department Course   ECG  ECG obtained at 0935, ECG read at 0943  Normal sinus rhythm. Normal ECG.  NO previous ECG to compare.  Rate 60 bpm. NC interval 182 ms. QRS duration 110 ms. QT/QTc 404/404 ms. P-R-T axes 71 53 33.     Imaging:  XR Chest 2 Views   Final Result   IMPRESSION: No radiographic evidence of acute chest abnormality.       MONIKA BARRIENTOS MD            SYSTEM ID:  [id number]        Report per radiology    Laboratory:  Labs Ordered and Resulted from Time of ED Arrival to Time of ED Departure   BASIC METABOLIC PANEL - Abnormal       Result Value    Sodium 141      Potassium 4.0      Chloride 110 (*)     Carbon Dioxide (CO2) 24      Anion Gap 7      Urea Nitrogen 23      Creatinine 0.76      Calcium 8.9      Glucose 106 (*)     GFR Estimate >90     TROPONIN I - Normal    Troponin I High Sensitivity 10     CBC WITH PLATELETS AND DIFFERENTIAL    WBC Count 4.7      RBC Count 5.37      Hemoglobin 16.3      Hematocrit 49.9      MCV 93      MCH 30.4      MCHC 32.7      RDW 12.6      Platelet Count 188      % Neutrophils 51      % Lymphocytes 38      % Monocytes 10      % Eosinophils 0      % Basophils 1      % Immature Granulocytes 0      NRBCs per 100 WBC 0      Absolute Neutrophils 2.4      Absolute Lymphocytes 1.8      Absolute Monocytes 0.4      Absolute Eosinophils 0.0      Absolute Basophils 0.0      Absolute Immature  Granulocytes 0.0      Absolute NRBCs 0.0        Emergency Department Course:    Reviewed:  I reviewed nursing notes, vitals, past medical history and Care Everywhere    Assessments/Consults:  ED Course as of 12/17/21 1047   Fri Dec 17, 2021   0937 Obtained history and examined the patient as noted above   1043 Rechecked the patient and prepared for discharge       Disposition:  The patient was discharged to home.     Impression & Plan     CMS Diagnoses: None    Medical Decision Making:  Fili is a very pleasant 66-year-old gentleman with history of prostate cancer and high blood pressure presents to the emergency department with chest pain and anxiety.  Patient reports yesterday while trying to open a jar he experienced some left-sided chest pain.  This abated quickly but has had several similar episodes recently so came into the emergency department for further evaluation.  In the ER, he is chest pain-free and well-appearing.  He is mildly hypertensive but otherwise hemodynamically stable.  He is oxygenating well on room air.  Patient reports he is quite active and occasionally cross-country skis.  He notes that he has not experienced any significant chest pain, dyspnea on exertion or decreased stamina with cross-country skiing.  However given his age and risk factors cardiac origin of his pain was considered. Based on his work-up this seems less likely.  EKG was obtained which not show any acute signs of ischemia nor dysrhythmia.  High-sensitivity troponin was low after symptoms that occurred yesterday.  I believe this safely rules out the patient and he can be managed as an outpatient.  Chest x-ray was also obtained which not show any acute signs of pneumothorax, wide mediastinum, pleural effusion or pulmonary edema.  No significant infectious symptoms.  Plan for outpatient follow-up with PCP and return to the emergency department with any new or worsening symptoms.    Diagnosis:    ICD-10-CM    1. Chest pain,  unspecified type  R07.9        Scribe Disclosure:  I, Carrillo Benavides, am serving as a scribe at 9:33 AM on 12/17/2021 to document services personally performed by Mehrdad Jeffrey MD based on my observations and the provider's statements to me.            Mehrdad Jeffrey MD  12/17/21 1049

## 2022-06-05 ENCOUNTER — HEALTH MAINTENANCE LETTER (OUTPATIENT)
Age: 67
End: 2022-06-05

## 2022-08-06 ENCOUNTER — HOSPITAL ENCOUNTER (EMERGENCY)
Facility: CLINIC | Age: 67
Discharge: HOME | End: 2022-08-06
Attending: PHYSICIAN ASSISTANT | Admitting: PHYSICIAN ASSISTANT
Payer: COMMERCIAL

## 2022-08-06 VITALS
TEMPERATURE: 98.2 F | OXYGEN SATURATION: 97 % | DIASTOLIC BLOOD PRESSURE: 97 MMHG | RESPIRATION RATE: 18 BRPM | HEART RATE: 68 BPM | SYSTOLIC BLOOD PRESSURE: 137 MMHG

## 2022-08-06 DIAGNOSIS — S61.012A LACERATION OF LEFT THUMB: ICD-10-CM

## 2022-08-06 PROCEDURE — 12001 RPR S/N/AX/GEN/TRNK 2.5CM/<: CPT

## 2022-08-06 PROCEDURE — 99283 EMERGENCY DEPT VISIT LOW MDM: CPT

## 2022-08-06 ASSESSMENT — ENCOUNTER SYMPTOMS: WOUND: 1

## 2022-08-07 NOTE — ED TRIAGE NOTES
Pt states cut left thumb with hatchet while cutting kindling tonight. Wound noted to posterior thumb, no active bleeding. CMS intact GCS 15     Triage Assessment     Row Name 08/06/22 2054       Triage Assessment (Adult)    Airway WDL WDL       Respiratory WDL    Respiratory WDL WDL       Skin Circulation/Temperature WDL    Skin Circulation/Temperature WDL WDL       Cardiac WDL    Cardiac WDL WDL       Peripheral/Neurovascular WDL    Peripheral Neurovascular WDL WDL       Cognitive/Neuro/Behavioral WDL    Cognitive/Neuro/Behavioral WDL WDL

## 2022-08-07 NOTE — ED NOTES
Wound cleaned with saline & gauze. Baci, gauz and gauze wrap applied. Finger immobilizer applied. Wound care education provided. No questions at this time CMS intact

## 2022-08-07 NOTE — ED PROVIDER NOTES
History   Chief Complaint:  Laceration       The history is provided by the patient.      Fili Padilla is a 67 year old male with history of hypertension and hyperlipidemia who presents with a laceration to the left thumb approximately 1 hour ago.  He reports chopping kindling with a hatchet when his hand got in the way and the hatchet cut his thumb.   He is able to bend it normally.  No numbness.    Review of Systems   Skin: Positive for wound (left thumb).   All other systems reviewed and are negative.    Allergies:  Codeine    Medications:  Amlodipine  Aspirin  Atorvastatin   Vitamin D  Skin cancer    Past Medical History:     Neoplasm of prostate   Hypertension  Ureterolithiasis  Adenoma of colon  Prostate cancer  Pancreatic neoplasm  KAVIN  Depression  Hyperlipidemia   Hypertension     Past Surgical History:    Prostate surgery  Skin cancer excision forehead     Family History:    Macular degeneration  Diabetes    Social History:  The patient presents with his wife.  The patient presents in a private vehicle.     Physical Exam     Patient Vitals for the past 24 hrs:   BP Temp Temp src Pulse Resp SpO2   08/06/22 2053 (!) 137/97 98.2  F (36.8  C) Oral 68 18 97 %       Physical Exam  General: Alert and cooperative with exam. Resting comfortably on gurney  Head:  Scalp is NC/AT  Eyes:  No scleral icterus, PERRL with normal tracking  ENT:  The external nose and ears are normal.   Neck:  Normal range of motion without rigidity.  Chest:  Normal effort.  No tachypnea or distress.  MSK:  Normal ROM in 1st MCP and IP joints.  Skin:  There is a 2 cm shallow laceration over the dorsal left thumb.  No foreign body or exposed tendon.  Thumb warm and well perfused with cap refill less than 2 seconds.  Neuro: Normal strength of flexion and extension at the first MCP and IP joints.  Normal sensation to two-point touch bilaterally in thumb.    No facial asymmetry.  GCS: 15.   Psych: Awake. Alert. Normal affect.  Appropriate interactions.          Emergency Department Course   Procedures     Laceration Repair      LACERATION:  A simple and superficial clean 2 cm laceration.    LOCATION:  Left thumb.    FUNCTION:  Distally sensation, circulation, motor and tendon function are intact.    ANESTHESIA:  Local using 0.5 bupivacaine without epi total of 2 mLs.    PREPARATION:  Irrigation with Normal Saline.    DEBRIDEMENT:  no debridement.    CLOSURE:  Wound was closed with One Layer.  Skin closed with 3 x 5.0 Ethylon using interrupted sutures.      Emergency Department Course:    Reviewed:  I reviewed nursing notes, vitals, past medical history and Care Everywhere    Assessments:  2102 I obtained history and examined the patient as noted above.     Interventions:  2117 I performed a laceration repair    Disposition:  The patient was discharged to home.     Impression & Plan     Medical Decision Making:  Fili Padilla is a 67 year old male who presents with laceration to thumb.  Patient history and records reviewed.  On examination, the patient has a laceration, but is otherwise well appearing.  There is no evidence of neurovascular compromise, fracture, imbedded foreign body, or tendon injury.  Wound was anesthetized, irrigated, explored, and closed as above with non-absorbale sutures.  Tetanus checked and UTD.  Discussed indications to return to ED if new or worsening symptoms, or signs of infection such as fever, swelling, spreading erythema, drainage, or increasing pain.  Advised sun protection to reduce scarring.  Follow-up with primary care provider in 10 days for suture removal.    Diagnosis:    ICD-10-CM    1. Laceration of left thumb  S61.012A        Discharge Medications:  New Prescriptions    No medications on file       Scribe Disclosure:  Katalina HERNANDEZ, am serving as a scribe at 9:00 PM on 8/6/2022 to document services personally performed by Surya Melara PA-C based on my observations and the  provider's statements to me.          Surya Melara PA-C  08/06/22 0357

## 2022-10-10 ENCOUNTER — HEALTH MAINTENANCE LETTER (OUTPATIENT)
Age: 67
End: 2022-10-10

## 2023-03-25 ENCOUNTER — HEALTH MAINTENANCE LETTER (OUTPATIENT)
Age: 68
End: 2023-03-25

## 2024-05-26 ENCOUNTER — HEALTH MAINTENANCE LETTER (OUTPATIENT)
Age: 69
End: 2024-05-26

## 2024-11-25 NOTE — PROGRESS NOTES
I have confirmed the patient's and reviewed Past Medical History, Past Surgical History, Social History, Family History, Problem List, Medication List and agree with Tech note.    CC: Vision changes right eye    HPI: Fili Padilla is a 63 year old male who presents for right eye Epiretinal membrane evaluation. Was seen by OLIVIER Liu 2 years prior and was offered surgery but declined surgery at the time. Noticed right eye vision worsening over the past month.    Denies any flashes or floaters.    Retinal Imaging:  OCT   RE: Epiretinal membrane with minimal macular edema  LE: Normal    Assessment and Plan:  1. Epiretinal membrane (ERM) of right eye  (primary encounter diagnosis)  - VA 20/40-, monitor for now    2. Nuclear senile cataract of both eyes  - Mild, can monitor for now      Return to clinic: return to clinic in 6 months with OCT    Jake Martins MD  PGY-3 Ophthalmology Resident  [phone]    Attestation:  I have seen and examined the patient with Dr. Martins and agree with the findings in this note, as well as the interpretations of the diagnostic tests.       Puneet Bocanegra MD PhD.  Professor & Chair           Patient contacted

## 2025-06-14 ENCOUNTER — HEALTH MAINTENANCE LETTER (OUTPATIENT)
Age: 70
End: 2025-06-14

## (undated) RX ORDER — GENTAMICIN 40 MG/ML
INJECTION, SOLUTION INTRAMUSCULAR; INTRAVENOUS
Status: DISPENSED
Start: 2019-02-11

## (undated) RX ORDER — LIDOCAINE HYDROCHLORIDE 10 MG/ML
INJECTION, SOLUTION EPIDURAL; INFILTRATION; INTRACAUDAL; PERINEURAL
Status: DISPENSED
Start: 2019-02-11